# Patient Record
Sex: MALE | Race: WHITE | NOT HISPANIC OR LATINO | URBAN - METROPOLITAN AREA
[De-identification: names, ages, dates, MRNs, and addresses within clinical notes are randomized per-mention and may not be internally consistent; named-entity substitution may affect disease eponyms.]

---

## 2023-10-31 ENCOUNTER — NURSE TRIAGE (OUTPATIENT)
Dept: NURSING | Facility: CLINIC | Age: 18
End: 2023-10-31

## 2023-10-31 NOTE — TELEPHONE ENCOUNTER
"Nurse Triage SBAR    Situation: Mouth sores/patches    Background: Patient calling. A few days ago he noted bumps inside his mouth. Allergic to red food coloring, cats/dogs, penicillin, eggs( no allergic reaction to eggs now).      Assessment: White/yellow in color - bumps. They look like they \"popped\" and have an opening. Some spots near his uvula. Some pain with swallowing. White spots in the inside near lips and cheeks. Denied chills - unable to take his temp. Drinking fluids and eating. Pain is a 2-4/10. Dry mouth. No bleeding. No difficulty breathing.    Protocol Recommended Disposition: See Physician within 3 days    Recommendation: According to the protocol, Patient should be seen within 3 days. Advised Patient that the patient needs to be seen within 3 days. Care advice given. Patient verbalizes understanding and agrees with plan of care. Reviewed concerning symptoms and when to call back. Patient is going to go into urgent care.     Clair Kelly RN Nursing Advisor 10/31/2023 6:18 PM     Reason for Disposition   [1] White patches that stick to tongue or inner cheek AND [2] can be wiped off   [1] Dry mouth AND [2] new-onset AND [3] unexplained (Exceptions: chronic symptom or dry mouth from mild dehydration)    Additional Information   Negative: SEVERE difficulty breathing (e.g., struggling for each breath, speaks in single words, stridor)   Negative: Sounds like a life-threatening emergency to the triager   Negative: Injury to tooth or teeth   Negative: Injury to mouth   Negative: Canker sore suspected (i.e., aphthous ulcer) in the mouth   Negative: Cold sore suspected (i.e., fever blister sore on the outer lip)   Negative: Tooth is painful or swelling around a tooth   Negative: Mouth is painful   Negative: Throat is painful   Negative: Tongue swelling   Negative: Lip swelling   Negative: [1] Drooling or spitting out saliva (because can't swallow) AND [2] new-onset   Negative: [1] Bleeding from mouth AND [2] " won't stop after 10 minutes of direct pressure   Negative: Electrical burn of mouth   Negative: Chemical burn of mouth   Negative: [1] Difficulty breathing AND [2] not severe   Negative: Patient sounds very sick or weak to the triager   Negative: [1] Bloody crusts on lips or sores in mouth AND [2] rash anywhere elese on body (back, chest, face, palms, soles)   Negative: [1] Gum bleeding AND [2] taking Coumadin (warfarin) or other strong blood thinner, or known bleeding disorder (e.g., thrombocytopenia)   Negative: [1] Dry mouth AND [2] urinating more frequently than usual (i.e., frequency)   Negative: [1] Dry mouth AND [2] drinking more liquids than usual (thirsty) AND [3] > 1 day (24 hours)   Negative: Receiving chemotherapy or radiation therapy    Protocols used: Mouth Symptoms-A-AH